# Patient Record
Sex: MALE | Race: BLACK OR AFRICAN AMERICAN
[De-identification: names, ages, dates, MRNs, and addresses within clinical notes are randomized per-mention and may not be internally consistent; named-entity substitution may affect disease eponyms.]

---

## 2021-07-30 ENCOUNTER — HOSPITAL ENCOUNTER (OUTPATIENT)
Dept: HOSPITAL 61 - ER | Age: 76
Discharge: HOME | End: 2021-07-30
Attending: INTERNAL MEDICINE
Payer: MEDICARE

## 2021-07-30 VITALS — DIASTOLIC BLOOD PRESSURE: 80 MMHG | SYSTOLIC BLOOD PRESSURE: 147 MMHG

## 2021-07-30 VITALS — HEIGHT: 66 IN | WEIGHT: 145.51 LBS | BODY MASS INDEX: 23.38 KG/M2

## 2021-07-30 VITALS — SYSTOLIC BLOOD PRESSURE: 157 MMHG | DIASTOLIC BLOOD PRESSURE: 77 MMHG

## 2021-07-30 DIAGNOSIS — I10: ICD-10-CM

## 2021-07-30 DIAGNOSIS — Z90.49: ICD-10-CM
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
## 2021-07-30 DIAGNOSIS — K31.89: ICD-10-CM

## 2021-07-30 DIAGNOSIS — J44.9: ICD-10-CM
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
## 2021-07-30 DIAGNOSIS — K29.70: ICD-10-CM

## 2021-07-30 DIAGNOSIS — T18.128A: Primary | ICD-10-CM

## 2021-07-30 DIAGNOSIS — Z98.890: ICD-10-CM

## 2021-07-30 DIAGNOSIS — Z20.822: ICD-10-CM
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
## 2021-07-30 DIAGNOSIS — R13.10: ICD-10-CM

## 2021-07-30 DIAGNOSIS — Z79.899: ICD-10-CM

## 2021-07-30 DIAGNOSIS — Z87.891: ICD-10-CM

## 2021-07-30 PROCEDURE — 99285 EMERGENCY DEPT VISIT HI MDM: CPT

## 2021-07-30 PROCEDURE — 96374 THER/PROPH/DIAG INJ IV PUSH: CPT

## 2021-07-30 PROCEDURE — 87426 SARSCOV CORONAVIRUS AG IA: CPT

## 2021-07-30 PROCEDURE — 71045 X-RAY EXAM CHEST 1 VIEW: CPT

## 2021-07-30 PROCEDURE — U0003 INFECTIOUS AGENT DETECTION BY NUCLEIC ACID (DNA OR RNA); SEVERE ACUTE RESPIRATORY SYNDROME CORONAVIRUS 2 (SARS-COV-2) (CORONAVIRUS DISEASE [COVID-19]), AMPLIFIED PROBE TECHNIQUE, MAKING USE OF HIGH THROUGHPUT TECHNOLOGIES AS DESCRIBED BY CMS-2020-01-R: HCPCS

## 2021-07-30 PROCEDURE — 43247 EGD REMOVE FOREIGN BODY: CPT

## 2021-07-30 NOTE — PREOP HP
DATE OF SERVICE: 07/30/2021

PREOPERATIVE HISTORY AND PHYSICAL



REFERRING PHYSICIAN:  Supriya Reyna MD



PRIMARY CARE PHYSICIAN:  Supriya Reyna MD



REASON FOR PROCEDURE:  Food bolus.



HISTORY OF PRESENT ILLNESS:  This is a 76-year-old gentleman who went into the 

ER today after he had chicken stuck in his esophagus since 2:00 p.m. on the day 

prior.  He is unable to handle his secretions, but denies any chest pain.  He 

states that he has to constantly spit.  The nurse practitioner in the ER stated 

that the patient was given 2 carbonated beverages in the ER and vomited both of 

those.  Otherwise, he denies any prior history of food boluses.  He did have his

COVID vaccination done in March of this year.



PAST MEDICAL HISTORY:

1.  COPD.

2.  Hypertension.

3.  Bone marrow cancer.



PAST SURGICAL HISTORY:  Cholecystectomy.



SOCIAL HISTORY:  He is a former smoker.  Denies alcohol or IV drug abuse.



REVIEW OF SYSTEMS:  A 13-point review of systems was done and is positive as per

HPI, otherwise negative.



ALLERGIES:  No known drug allergies.



MEDICATIONS:  He is on no blood thinners.



PHYSICAL EXAMINATION:

GENERAL:  He is a well-developed, thin -American gentleman who is 

spitting into a tissue.

HEENT:  Oropharynx is clear.

CARDIAC:  S1, S2.

LUNGS:  Clear.

ABDOMEN:  Active bowel sounds, soft, nontender, nondistended.

EXTREMITIES:  No edema.

NEUROLOGIC:  Awake, alert and oriented x 3.



ASSESSMENT AND PLAN:  Food bolus.



The risks and benefits of the upper endoscopy, including bleeding, perforation 

nondiagnosis and sedation were explained and he has agreed to proceed.



Thank you for allowing me to participate in care of this patient.







ALBA/ADRIAN

DR: Cher   DD: 07/30/2021 21:57

DT: 07/30/2021 22:12   TID: [id number]

CC:     SUPRIYA REYNA MD

## 2021-07-30 NOTE — RAD
AP chest x-ray



HISTORY: Food bolus in esophagus.



COMPARISON: Chest x-ray June 11, 2014.



FINDINGS: There is mild bilateral chronic pleural effusions or pleural thickening along the diaphragm
s blunting the costophrenic angles stable to the prior study. Calcified granuloma right lower lobe wi
th a 1 cm densely calcified nodule. Heart size normal. Aortic arch calcified plaque. No radiopaque fo
reign body within the mediastinum evident. No pneumothorax. No pulmonary opacities. Bones are unremar
kable.



IMPRESSION: No acute process in the chest evident. Stable exam as described above.



Electronically signed by: Virgil Nicole MD (7/30/2021 6:23 PM) [facility]

## 2021-07-30 NOTE — PHYS DOC
Past Medical History


Past Medical History:  COPD, Hypertension


Additional Past Medical Histor:  BONE MARROW CANCER


Past Surgical History:  Cholecystectomy


Smoking Status:  Former Smoker


Alcohol Use:  None


Drug Use:  None





General Adult


EDM:


Chief Complaint:  GI PROBLEM





HPI:


HPI:





Patient is a 76  year old male presents to the emergency department with chief 

complaint of chicken stuck in his throat since 2:00 yesterday afternoon.  

Patient states that when he swallows, the fluid comes right back up.  Patient 

denies pain with swallowing, denies chest pain or shortness of breath.  Patient 

denies recent fever or chills.  Denies chest congestion or chest palpitations.  

Patient reports he has received the COVID-19 virus vaccination, completed the 

series in March of this year, states it was the Pfizer brand.  Patient denies 

having any esophageal problems or history of having food stuck in his throat or 

esophagus patient denies any other physical complaints or physical concerns.





Review of Systems:


Review of Systems:


14 body systems of review of systems have been reviewed.  See HPI for pertinent 

positives and negative responses, otherwise all other systems are negative, 

nonpertinent or noncontributory.


Constitutional: Negative except as outlined in HPI above.


Skin: Negative except as outlined in HPI above.


Eyes:   Negative except as outlined in HPI above.


HENT: Negative except as outlined in HPI above.


Respiratory:   Negative except as outlined in HPI above.


Cardiovascular:   Negative except as outlined in HPI above.


GI:   Negative except as outlined in HPI above.


:  Negative except as outlined in HPI above.


Musculoskeletal:   Negative except as outlined in HPI above.


Integument:   Negative except as outlined in HPI above.


Neurologic:   Negative except as outlined in HPI above.


Endocrine:   Negative except as outlined in HPI above.


Lymphatic:  Negative except as outlined in HPI above.


Psychiatric:  Negative except as outlined in HPI above.





Heart Score:


C/O Chest Pain:  No


Risk Factors:


Risk Factors:  DM, Current or recent (<one month) smoker, HTN, HLP, family 

history of CAD, obesity.


Risk Scores:


Score 0 - 3:  2.5% MACE over next 6 weeks - Discharge Home


Score 4 - 6:  20.3% MACE over next 6 weeks - Admit for Clinical Observation


Score 7 - 10:  72.7% MACE over next 6 weeks - Early Invasive Strategies





Current Medications:





Current Medications








 Medications


  (Trade)  Dose


 Ordered  Sig/Ely  Start Time


 Stop Time Status Last Admin


Dose Admin


 


 Glucagon


  (Glucagen)  1 mg  1X  ONCE  21 17:45


 21 17:46 DC 21 18:29


1 MG











Allergies:


Allergies:





Allergies








Coded Allergies Type Severity Reaction Last Updated Verified


 


  No Known Drug Allergies    14 No











Physical Exam:


PE:





Constitutional: Well developed, well nourished, no acute distress, non-toxic 

appearance.  76-year-old male in no apparent distress.


HENT: Normocephalic, atraumatic.  Patient spitting up clear saliva into emesis 

basin.  Patient speaking in normal voice tones, no trismus.  No lymphadenopathy 

of the head or neck.


Eyes: Conjunctiva normal, no discharge.


Neck: Normal range of motion, no stridor.


Cardiovascular: No cyanosis appreciated, distal cap refill less than 2 seconds.


Lungs & Thorax: Patient is in no respiratory distress, no audible adventitious 

lung sounds appreciated.


Abdomen: Nontender, no abnormalities noted.  Normal bowel sounds presentation.


Skin: Warm, dry, no erythema, no rash.


Back: No tenderness, no deformities.


Extremities: No tenderness, no cyanosis, no clubbing, ROM intact, no edema.  


Neurologic: Alert and oriented X 3, normal motor function, normal sensory 

function, no focal deficits noted. 


Psychologic: Affect normal, judgement normal, mood normal.





Current Patient Data:


Vital Signs:





                                   Vital Signs








  Date Time  Temp Pulse Resp B/P (MAP) Pulse Ox O2 Delivery O2 Flow Rate FiO2


 


21 18:26  84 21 120/80 (93) 99 Room Air  


 


21 16:50 99.0       





 99.0       











EKG:


EKG:


[]





Radiology/Procedures:


Radiology/Procedures:


PATIENT: PHAM STODDARD   ACCOUNT: [id number]     MRN#: [id number]


: 1945           LOCATION: ER              AGE: 76


SEX: M                    EXAM DT: 21         ACCESSION#: [id number]


STATUS: REG ER            ORD. PHYSICIAN: MELINDA TRUJILLO


REASON: food bolus in esophagus


PROCEDURE: CHEST AP ONLY





AP chest x-ray





HISTORY: Food bolus in esophagus.





COMPARISON: Chest x-ray 2014.





FINDINGS: There is mild bilateral chronic pleural effusions or pleural 

thickening along the diaphragms blunting the costophrenic angles stable to the 

prior study. Calcified granuloma right lower lobe with a 1 cm densely calcified 

nodule. Heart size normal. Aortic arch calcified plaque. No radiopaque foreign 

body within the mediastinum evident. No pneumothorax. No pulmonary opacities. 

Bones are unremarkable.





IMPRESSION: No acute process in the chest evident. Stable exam as described 

above.





Electronically signed by: Virgil Nicole MD (2021 6:23 PM) [facility]





Course & Med Decision Making:


Course & Med Decision Making


Pertinent Labs and Imaging studies reviewed. (See chart for details)





76-year-old male, vital signs reviewed, presents to the emergency department 

complaining of a piece of chicken stuck in his throat since eating yesterday at 

2 PM.  Physical examination is consistent with esophageal food bolus as 

evidenced by patient unable to swallow secretions.  The patient does not 

complain of any chest pain, nausea, or painful swallowing.  Will insert IV, 

order chest x-ray to rule out aspiration pneumonia, give IV glucagon.  Discussed

 with patient ED planning to give IV glucagon in hopes to relax esophagus to 

allow chicken food bolus to pass.  Patient is amenable to this plan.





At 1830, ED nurse reported glucagon given.  Will wait a period of time and 

reevaluate patient for results of IV glucagon.





At 1930, patient still unable to swallow secretions, has good gag reflex and is 

not coughing on secretions, is expelling them into emesis basin.  Attempted to 

dislodge food bolus with carbonated soda, patient took small sip and tried to 

swallow, however unsuccessful, carbonated beverage was just spit up back into 

emesis basin.  Discussed with patient that at this point food bolus has been 

lodged in esophagus for approximately 28 hours, unsuccessful attempts to 

dislodge bolus with IV glucagon and carbonated soda swallowing, will consult 

with on-call GI specialist, patient is amenable to this plan.





Called and discussed patient case and ED work-up with GI specialist Dr. Villeda 

who agrees patient requires EGD to evaluate suspected food bolus with the 

information Dr. Villeda was given by me.  The patient has reported a COVID-19 

virus vaccination, however a rapid COVID-19 virus was ordered to rule out acute 

infection for patient and staff safety.  Patient was amenable to this test.  Dr. Villeda requested nursing staff complete surgical consult for EGD for evaluation

 of food bolus, this was relayed to patient's primary ED nurse Sandra.  Discussed

 with patient GI specialist Dr. Villeda's plan to take him to the OR to perform 

EGD to evaluate and remove fluid bolus, patient is amenable to this plan.  

[facility] supervisor aware, Dr. Villeda has alerted the OR staff for 

procedure, patient is waiting for procedure, Dr. Villeda will assume patient care

 for procedure at this time.





Dragon Disclaimer:


Dragon Disclaimer:


This electronic medical record was generated, in whole or in part, using a voice

 recognition dictation system.





Departure


Departure


Impression:  


   Primary Impression:  


   Esophageal obstruction due to food impaction


Disposition:   HOME / SELF CARE / HOMELESS


Condition:  GOOD


Referrals:  


SUPRIYA HEATH MD (PCP)





Additional Instructions:  


You were seen today in the emergency department for a piece of chicken stuck in 

your esophagus.  We were unable to dislodge this food bolus here in the 

emergency department, you require a GI specialist to dislodge this food bolus.  

You will be taken to the operating room as we discussed and Dr. Villeda will 

perform this procedure.  Please return to the emergency department for worsening

 symptoms or other concerns.











MELINDA TRUJILLO       2021 19:28

## 2021-07-31 NOTE — NUR
IP: Attempted to contact pt concerning covid results. Phone number provided is no longer in 
service.

## 2021-08-01 NOTE — OP
DATE OF SURGERY: 07/30/2021

ENDOSCOPY REPORT



REQUESTING PHYSICIAN:  Robert Reyna MD



PRIMARY CARE PHYSICIAN:  Robert Reyna MD



REASON FOR PROCEDURE:  Food bolus.



HISTORY OF PRESENT ILLNESS:  This is a 76-year-old gentleman who presents with a

food bolus.  He had a piece of chicken at 2:00 p.m. on the day prior to coming 

into the Emergency Room.  He is to undergo an EGD for food bolus removal.  The 

risks and benefits of the procedure including bleeding, perforation, 

non-diagnosis and sedation were explained.  He has agreed to proceed.



MEDICATIONS:  General anesthesia.



DESCRIPTION OF PROCEDURE:  The upper Olympus endoscope was introduced from the 

mouth and passed into the upper and middle esophagus.  A large piece of chicken 

was noted.  This was stuck in the esophagus.  Grade 4 esophagitis was found 

throughout the esophagus.  A Cortes net was used to remove portions of the 

chicken.  The remainder of the chicken was pushed down into the stomach.  Grade 

4 esophagitis was noted in the entire esophagus.  Ulcers were present.  The 

scope was then passed into the fundus, body and antrum of the stomach.  

Gastritis was noted in the antrum.  Retroflexion was performed.  There was no 

hiatal hernia.  More food bolus was found in the fundus.  The scope was then 

passed through the pylorus into the duodenal bulb and first and second portion 

of the duodenum.  There was no mucosal abnormality.  The scope was completely 

withdrawn.  The patient suffered no complications.



FINDINGS:

1.  Food bolus in the middle esophagus.

2.  Grade 4 esophagitis with ulcer.

3.  Gastritis in the antrum.

4.  Food bolus removal.



PLAN:

1.  Favor a liquid diet for 48 hours and start a PPI daily.

2.  I would ask that the patient return to my office and we will repeat the 

upper endoscopy and take biopsies in 2-4 weeks.



Thank you for allowing me to participate in the care of this patient.







ALBA/SUB/SOT

DR: ALBA/letty   DD: 07/30/2021 22:18

DT: 07/30/2021 22:35   TID: [id number]

CC:     ROBERT REYNA MD